# Patient Record
Sex: FEMALE | Race: WHITE | NOT HISPANIC OR LATINO
[De-identification: names, ages, dates, MRNs, and addresses within clinical notes are randomized per-mention and may not be internally consistent; named-entity substitution may affect disease eponyms.]

---

## 2017-03-09 PROBLEM — Z00.00 ENCOUNTER FOR PREVENTIVE HEALTH EXAMINATION: Status: ACTIVE | Noted: 2017-03-09

## 2019-12-23 ENCOUNTER — APPOINTMENT (OUTPATIENT)
Dept: ORTHOPEDIC SURGERY | Facility: CLINIC | Age: 69
End: 2019-12-23
Payer: MEDICARE

## 2019-12-23 VITALS — SYSTOLIC BLOOD PRESSURE: 130 MMHG | OXYGEN SATURATION: 98 % | HEART RATE: 99 BPM | DIASTOLIC BLOOD PRESSURE: 80 MMHG

## 2019-12-23 VITALS — WEIGHT: 112 LBS | HEIGHT: 61 IN | BODY MASS INDEX: 21.14 KG/M2

## 2019-12-23 DIAGNOSIS — Z78.9 OTHER SPECIFIED HEALTH STATUS: ICD-10-CM

## 2019-12-23 DIAGNOSIS — Z87.891 PERSONAL HISTORY OF NICOTINE DEPENDENCE: ICD-10-CM

## 2019-12-23 DIAGNOSIS — Z82.61 FAMILY HISTORY OF ARTHRITIS: ICD-10-CM

## 2019-12-23 DIAGNOSIS — S42.291A OTHER DISPLACED FRACTURE OF UPPER END OF RIGHT HUMERUS, INITIAL ENCOUNTER FOR CLOSED FRACTURE: ICD-10-CM

## 2019-12-23 DIAGNOSIS — Z60.2 PROBLEMS RELATED TO LIVING ALONE: ICD-10-CM

## 2019-12-23 DIAGNOSIS — Z87.19 PERSONAL HISTORY OF OTHER DISEASES OF THE DIGESTIVE SYSTEM: ICD-10-CM

## 2019-12-23 PROCEDURE — 73030 X-RAY EXAM OF SHOULDER: CPT | Mod: RT

## 2019-12-23 PROCEDURE — 99205 OFFICE O/P NEW HI 60 MIN: CPT

## 2019-12-23 RX ORDER — MESALAMINE 1.2 G/1
1.2 TABLET, DELAYED RELEASE ORAL
Qty: 360 | Refills: 0 | Status: ACTIVE | COMMUNITY
Start: 2019-07-25

## 2019-12-23 RX ORDER — MESALAMINE 1.2 G/1
TABLET, DELAYED RELEASE ORAL
Refills: 0 | Status: ACTIVE | COMMUNITY

## 2019-12-23 SDOH — SOCIAL STABILITY - SOCIAL INSECURITY: PROBLEMS RELATED TO LIVING ALONE: Z60.2

## 2019-12-23 NOTE — PHYSICAL EXAM
[de-identified] : General appearance: well nourished and hydrated, pleasant, alert and oriented x 3, cooperative.  \par HEENT: normocephalic, EOM intact, nasal septum midline, oral cavity clear, external auditory canal clear.  \par Cardiovascular: no lower leg edema, no varicosities, dorsalis pedis pulses palpable and symmetric.  \par Lymphatics: no palpable lymphadenopathy, no lymphedema.  \par Neurologic: sensation is normal, no muscle weakness in left upper or bilateral lower extremities, patella tendon reflexes present and symmetric.  \par Dermatologic: skin moist, warm, no rash.  \par Spine: cervical spine with normal lordosis and painless range of motion, thoracic spine with normal kyphosis and painless range of motion, lumbosacral spine with normal lordosis and painless range of motion. \par Gait: normal.  \par \par Right shoulder\par - Inspection: moderate swelling about the shoulder with anteromedial ecchymosis throughout the arm. Unable to assess muscle asymmetry or atrophy.  Skin intact; no scars or surgical incisions.\par - Palpation: tender to palpation about the proximal humerus. No tenderness noted to acromion, ACJ, scapula, rhomboids.  \par - ROM: testing deferred.\par - Strength: testing deferred.\par - Stability: testing deferred.\par - Impingement: testing deferred.\par \par Right elbow\par - Inspection: negative swelling, ecchymosis, and erythema.  \par - Wounds: none.  \par - Palpation: non-tender.  \par - ROM: 0-150 flexion, 80/80 prono/supination.  \par - Strength: 5/5 flexion, extension, pronation, and supination without pain.  \par - Stability: no varus/valgus instability.  \par \par Right wrist\par - Inspection: negative swelling, ecchymosis, and erythema.  \par - Wounds: none.  \par - Palpation: no tenderness.  \par - ROM: 90/90 flexion/extension, 80/80 prono/supination.\par - Strength: 5/5 flexion, extension, pronation, and supination without pain.  \par \par Right hand\par - Inspection: negative swelling, ecchymosis, and erythema.  \par - Wounds: none.  \par - Strength: able to make full fist with normal strength. Able to make thumbs-up, A-OK, adduct and abduct fingers.\par - Sensation: light touch intact all fingers and thumb.  \par - Vascular: capillary refill < 2 seconds, all fingers and thumb.\par  [de-identified] : 5 views of the right shoulder (Grashey, AP, scapular Y, transthoracic lateral, and axillary) were initially obtained today and interpreted by me. There is a comminuted proximal humerus fracture with significant shortening and apex anterior angulation at the anatomic neck. Glenohumeral joint concentrically reduced. \par \par 2 additional views of the right shoulder (Grashey, axillary) were obtained following an attempted closed reduction. No significant change in position/angulation.

## 2019-12-23 NOTE — HISTORY OF PRESENT ILLNESS
[Worsening] : worsening [___ yrs] : [unfilled] year(s) ago [Intermit.] : ~He/She~ states the symptoms seem to be intermittent [de-identified] : 68y/o RHD female accompanied by her son in law for evaluation of closed right proximal humerus fracture. She tripped over a bag of apples yesterday and fell on outstretched right arm with immediate pain and swelling at the right shoulder. She went to the [place] ED for evaluation where the fracture was diagnosed and she was placed into a sling. She has already been using the RUE somewhat for dressing and holding light objects. Pain is currently 5/10 intensity, dull in nature, alleviated by Tylenol and exacerbated by movement. She denies prior fractures. She has been struggling with Crohn's disease for years and is currently on mesalamine. She had bilateral AN done by Dr. Wallace some years ago at [facility] which are functioning well.\par \par She is retired. Her daughter is an ENT physician.

## 2019-12-23 NOTE — PROCEDURE
[de-identified] : Right proximal humerus closed reduction under local anesthesia:\par \par 10cc of 1% lidocaine was injected anteriorly toward the fracture site after skin preparation with Betadine and isopropyl alcohol. Fracture hematoma was aspirated prior to application of the injectate. Patient verbalized mild relief of pain following the injection. Closed reduction of the proximal humerus fracture was attempted with gentle traction and a posterior/valgus directed force toward the proximal humeral shaft. Some fracture crepitus was appreciated; however, the patient requested procedure  prior to significant gross realignment.

## 2019-12-23 NOTE — DISCUSSION/SUMMARY
[de-identified] : 68y/o female 1d s/p right proximal humerus fracture\par - We discussed the natural history of this injury and treatment options. Given the relatively significant amount of displacement and angulation, I recommended an attempt at closed reduction under local anesthesia in the office, detailed above. Following the procedure, we reviewed the possible treatments of closed fracture management versus surgical management with either ORIF or arthroplasty. She is strongly averse to surgical treatment so we elected to move forward with closed treatment. I cautioned her that it is likely that she will lose shoulder ROM given the fracture displacement and angulation. I also cautioned her that some degree of post-traumatic arthritis is likely. I informed her that surgical treatment may eventually be required for rotator cuff dysfunction and/or arthritis. She understands the risks.\par - NWB in sling x 2 weeks, then begin passive and active-assisted ROM (pendulums and wall-crawls demonstrated)\par - RTC 6wk with new XRs

## 2020-02-03 ENCOUNTER — APPOINTMENT (OUTPATIENT)
Dept: ORTHOPEDIC SURGERY | Facility: CLINIC | Age: 70
End: 2020-02-03
Payer: MEDICARE

## 2020-02-03 VITALS — WEIGHT: 112 LBS | HEIGHT: 61 IN | BODY MASS INDEX: 21.14 KG/M2

## 2020-02-03 PROCEDURE — 73030 X-RAY EXAM OF SHOULDER: CPT | Mod: RT

## 2020-02-03 PROCEDURE — 99214 OFFICE O/P EST MOD 30 MIN: CPT

## 2020-02-03 NOTE — HISTORY OF PRESENT ILLNESS
[de-identified] : 68y/o female accompanied by daughter for follow-up evaluation of right proximal humerus fracture, DOI 12/22/19. Pain is significantly improved compared to last visit about 6 weeks ago. She has been wearing the sling outside the house but takes it off inside and has been doing PROM/AAROM exercises as I instructed for the last couple of weeks. She has not been taking anything for pain. She does complain of some new right palm and 4th finger pain upon awakening some mornings.

## 2020-02-03 NOTE — DISCUSSION/SUMMARY
[de-identified] : 68y/o female 6 weeks s/p right closed proximal humerus fracture\par - Begin AROM with PT, WB < 5lbs\par - Encouraged ongoing HEP\par - Cautioned again regarding possible need for arthroplasty in the future; they understand\par - RTC 2mo with new XRs, check ROM

## 2020-02-03 NOTE — PHYSICAL EXAM
[de-identified] : General appearance: well nourished and hydrated, pleasant, alert and oriented x 3, cooperative.  \par HEENT: normocephalic, EOM intact, nasal septum midline, oral cavity clear, external auditory canal clear.  \par Cardiovascular: no lower leg edema, no varicosities, dorsalis pedis pulses palpable and symmetric.  \par Lymphatics: no palpable lymphadenopathy, no lymphedema.  \par Neurologic: sensation is normal, no muscle weakness in upper or lower extremities, patella tendon reflexes present and symmetric.  \par Dermatologic: skin moist, warm, no rash.  \par Spine: cervical spine with normal lordosis and painless range of motion, thoracic spine with normal kyphosis and painless range of motion, lumbosacral spine with normal lordosis and painless range of motion.\par Gait: normal.  \par \par Right shoulder\par - Inspection: no muscle asymmetry or atrophy.  No scars or surgical incisions.  No scapular winging. No visible deformity.\par - Palpation: no tenderness noted to acromion, ACJ, scapula, rhomboids.  Nontender to light palpation throughout humerus.\par - AROM: 0-30 FF/ABD, 10 ER, IR to back pocket\par - PROM: 0-70 FF/ABD\par - Strength: deferred.\par \par Right hand\par - Inspection: negative swelling, ecchymosis, and erythema. \par - Wounds: none. \par - Strength: able to make full fist with normal strength. Able to make thumbs-up, A-OK, adduct and abduct fingers.\par - Sensation: light touch intact all fingers and thumb. \par - Vascular: capillary refill < 2 seconds, all fingers and thumb.\par - Special maneuvers: negative Tinel's at carpal and cubital tunnels, negative Durkan, negative Phalen [de-identified] : 4 views of the right shoulder (Grashey, AP, scapular Y, and axillary) were obtained today and interpreted by me. The comminuted proximal humerus fracture in unchanged in position/alignment as compared to prior exam. There is healing callus noted throughout the fracture. Glenohumeral joint remains reduced.

## 2020-04-14 ENCOUNTER — APPOINTMENT (OUTPATIENT)
Dept: ENDOCRINOLOGY | Facility: CLINIC | Age: 70
End: 2020-04-14

## 2020-05-19 ENCOUNTER — APPOINTMENT (OUTPATIENT)
Dept: ORTHOPEDIC SURGERY | Facility: CLINIC | Age: 70
End: 2020-05-19
Payer: MEDICARE

## 2020-05-19 ENCOUNTER — RESULT REVIEW (OUTPATIENT)
Age: 70
End: 2020-05-19

## 2020-05-19 ENCOUNTER — OUTPATIENT (OUTPATIENT)
Dept: OUTPATIENT SERVICES | Facility: HOSPITAL | Age: 70
LOS: 1 days | End: 2020-05-19
Payer: MEDICARE

## 2020-05-19 VITALS — WEIGHT: 112 LBS | TEMPERATURE: 98.7 F | HEIGHT: 61 IN | BODY MASS INDEX: 21.14 KG/M2

## 2020-05-19 DIAGNOSIS — S42.291D OTHER DISPLACED FRACTURE OF UPPER END OF RIGHT HUMERUS, SUBSEQUENT ENCOUNTER FOR FRACTURE WITH ROUTINE HEALING: ICD-10-CM

## 2020-05-19 PROCEDURE — 73030 X-RAY EXAM OF SHOULDER: CPT

## 2020-05-19 PROCEDURE — 73030 X-RAY EXAM OF SHOULDER: CPT | Mod: 26,RT

## 2020-05-19 PROCEDURE — 99213 OFFICE O/P EST LOW 20 MIN: CPT

## 2020-05-19 NOTE — PHYSICAL EXAM
[de-identified] : General appearance: well nourished and hydrated, pleasant, alert and oriented x 3, cooperative. \par HEENT: normocephalic, EOM intact, nasal septum midline, oral cavity clear, external auditory canal clear. \par Cardiovascular: no lower leg edema, no varicosities, dorsalis pedis pulses palpable and symmetric. \par Lymphatics: no palpable lymphadenopathy, no lymphedema. \par Neurologic: sensation is normal, no muscle weakness in upper or lower extremities, patella tendon reflexes present and symmetric. \par Dermatologic: skin moist, warm, no rash. \par Spine: cervical spine with normal lordosis and painless range of motion, thoracic spine with normal kyphosis and painless range of motion, lumbosacral spine with normal lordosis and painless range of motion.\par Gait: normal. \par \par Right shoulder\par - Inspection: no muscle asymmetry or atrophy. No scars or surgical incisions. No scapular winging. No visible deformity.\par - Palpation: no tenderness noted to acromion, ACJ, scapula, rhomboids. Nontender to palpation throughout humerus.\par - AROM: 0-60 FF/ABD, 10 ER, IR to L2\par - Strength: 4+/5 all planes.\par \par Right hand\par - Inspection: negative swelling, ecchymosis, and erythema. \par - Wounds: none. \par - Strength: able to make full fist with normal strength. Able to make thumbs-up, A-OK, adduct and abduct fingers.\par - Sensation: light touch intact all fingers and thumb. \par - Vascular: capillary refill < 2 seconds, all fingers and thumb. [de-identified] : XRs of the right shoulder taken today demonstrate proximal humeral fracture healing in unchanged alignment as compared to prior studies. Glenohumeral joint is reduced.

## 2020-05-19 NOTE — DISCUSSION/SUMMARY
[de-identified] : 70y/o female about 5mo s/p right proximal humerus fracture\par - Discussed possible need for TSA in future if there is progression of arthritis and clinical deterioration. She understands but is still trying to limit any further surgery if at all possible.\par - Cont PT, HEP\par - DEXA ordered\par - Recommendations given for new PMD for ongoing osteoporosis workup\par - Follow up as needed

## 2020-05-19 NOTE — HISTORY OF PRESENT ILLNESS
[de-identified] : 68y/o female f/u for right proximal humerus fracture, DOI 12/22/19. Overall doing significantly better. No pain at rest, though some discomfort with maximal overhead activity. She can do all her ADLs. She has been attending PT weekly. Occasional soreness with changing weather but this is something that has been consistent for her entire body for many years. No specific complaints.\par \par She attempted to schedule an appointment with an endocrinologist for bone health workup but was told that she needed prior DEXA and bloodwork as a prerequisite to being seen (?). She attempted to see her PMD for testing but has been unable to get in contact with him.

## 2020-07-28 ENCOUNTER — APPOINTMENT (OUTPATIENT)
Dept: ENDOCRINOLOGY | Facility: CLINIC | Age: 70
End: 2020-07-28